# Patient Record
Sex: FEMALE | URBAN - METROPOLITAN AREA
[De-identification: names, ages, dates, MRNs, and addresses within clinical notes are randomized per-mention and may not be internally consistent; named-entity substitution may affect disease eponyms.]

---

## 2021-09-09 ENCOUNTER — OFFICE VISIT (OUTPATIENT)
Dept: URBAN - METROPOLITAN AREA CLINIC 10 | Facility: CLINIC | Age: 81
End: 2021-09-09
Payer: MEDICARE

## 2021-09-09 DIAGNOSIS — H25.13 AGE-RELATED NUCLEAR CATARACT, BILATERAL: Primary | ICD-10-CM

## 2021-09-09 PROCEDURE — 92133 CPTRZD OPH DX IMG PST SGM ON: CPT | Performed by: OPHTHALMOLOGY

## 2021-09-09 PROCEDURE — 92134 CPTRZ OPH DX IMG PST SGM RTA: CPT | Performed by: OPHTHALMOLOGY

## 2021-09-09 PROCEDURE — 92250 FUNDUS PHOTOGRAPHY W/I&R: CPT | Performed by: OPHTHALMOLOGY

## 2021-09-09 PROCEDURE — 92004 COMPRE OPH EXAM NEW PT 1/>: CPT | Performed by: OPHTHALMOLOGY

## 2021-09-09 ASSESSMENT — INTRAOCULAR PRESSURE
OD: 14
OS: 14

## 2021-09-09 ASSESSMENT — VISUAL ACUITY
OD: 20/25
OS: 20/30

## 2021-09-09 NOTE — IMPRESSION/PLAN
Impression: Age-related nuclear cataract, bilateral: H25.13. Plan: Discussed cataract with patient. Discussed treatment options. Surgical treatment is recommended. Surgical risk and benefits were discussed. The patient elects to proceed with surgery. Recommend surgery OU.

## 2021-09-09 NOTE — IMPRESSION/PLAN
Impression: Age-related nuclear cataract, bilateral: H25.13. Plan: Discussed cataract with patient. Discussed treatment options. Surgical treatment is recommended. Surgical risk and benefits were discussed. The patient elects to proceed with surgery. Recommend surgery OU.  OS first

## 2021-11-10 ENCOUNTER — ADULT PHYSICAL (OUTPATIENT)
Dept: URBAN - METROPOLITAN AREA CLINIC 10 | Facility: CLINIC | Age: 81
End: 2021-11-10
Payer: MEDICARE

## 2021-11-10 DIAGNOSIS — Z01.818 ENCOUNTER FOR OTHER PREPROCEDURAL EXAMINATION: Primary | ICD-10-CM

## 2021-11-10 PROCEDURE — 99202 OFFICE O/P NEW SF 15 MIN: CPT | Performed by: PHYSICIAN ASSISTANT

## 2021-11-10 PROCEDURE — 92025 CPTRIZED CORNEAL TOPOGRAPHY: CPT | Performed by: OPHTHALMOLOGY

## 2021-11-11 ENCOUNTER — PRE-OPERATIVE VISIT (OUTPATIENT)
Dept: URBAN - METROPOLITAN AREA CLINIC 10 | Facility: CLINIC | Age: 81
End: 2021-11-11
Payer: MEDICARE

## 2021-11-11 DIAGNOSIS — H52.223 REGULAR ASTIGMATISM, BILATERAL: ICD-10-CM

## 2021-11-11 DIAGNOSIS — H25.11 AGE-RELATED NUCLEAR CATARACT, RIGHT EYE: ICD-10-CM

## 2021-11-11 PROCEDURE — 99214 OFFICE O/P EST MOD 30 MIN: CPT | Performed by: OPHTHALMOLOGY

## 2021-11-11 NOTE — IMPRESSION/PLAN
Impression: Age-related nuclear cataract, bilateral: H25.13. Plan: Discussed cataract diagnosis with the patient. Discussed and reviewed treatment options for cataracts. Surgical treatment is required for cataracts. Risks and benefits of surgical treatment were discussed and understood. Patient elects surgical treatment. Recommend surgery OU,OS  first. PLAN STD LENS, DISTANCE AIM, NO UPGRADES, REVIEWED SORAYA, PLAN LRI. PATIENT UNDERSTANDS THE NEED FOR GLASSES POST-OP FOR BEST OVER ALL VISION. Discussed limitations to vision post op . Carlee Stanley If first eye doing well, ok to proceed with second eye surgery . Carlee Stanley  INJECTABLE

## 2021-11-18 ENCOUNTER — SURGERY (OUTPATIENT)
Dept: URBAN - METROPOLITAN AREA SURGERY 5 | Facility: SURGERY | Age: 81
End: 2021-11-18
Payer: MEDICARE

## 2021-11-18 PROCEDURE — 66984 XCAPSL CTRC RMVL W/O ECP: CPT | Performed by: OPHTHALMOLOGY

## 2021-11-19 ENCOUNTER — POST-OPERATIVE VISIT (OUTPATIENT)
Dept: URBAN - METROPOLITAN AREA CLINIC 10 | Facility: CLINIC | Age: 81
End: 2021-11-19

## 2021-11-19 PROCEDURE — 99024 POSTOP FOLLOW-UP VISIT: CPT | Performed by: OPTOMETRIST

## 2021-11-19 ASSESSMENT — INTRAOCULAR PRESSURE: OS: 12

## 2021-11-19 NOTE — IMPRESSION/PLAN
Impression: S/P Cataract Extraction/IOL; LRI (Limbal Relaxing Incision) OS - 1 Day. Presence of intraocular lens  Z96.1. Plan: RTC 1 week PO. --Advised patient to use artificial tears for comfort.

## 2021-11-24 ENCOUNTER — POST-OPERATIVE VISIT (OUTPATIENT)
Dept: URBAN - METROPOLITAN AREA CLINIC 10 | Facility: CLINIC | Age: 81
End: 2021-11-24

## 2021-11-24 DIAGNOSIS — Z48.810 ENCOUNTER FOR SURGICAL AFTERCARE FOLLOWING SURGERY ON A SENSE ORGAN: Primary | ICD-10-CM

## 2021-11-24 PROCEDURE — 99024 POSTOP FOLLOW-UP VISIT: CPT | Performed by: OPTOMETRIST

## 2021-11-24 ASSESSMENT — INTRAOCULAR PRESSURE
OD: 12
OS: 10

## 2021-11-24 ASSESSMENT — VISUAL ACUITY
OS: 20/30
OD: 20/25

## 2021-11-24 NOTE — IMPRESSION/PLAN
Impression: S/P Cataract Extraction/IOL; LRI (Limbal Relaxing Incision) OS - 6 Days. Encounter for surgical aftercare following surgery on a sense organ  Z48.810. Condition is improving - Plan: Pt happy c vision. Confirmed Distance aim OD Pt. noticing dexycu sub conj. Reassurance. 
 Cleared for surgery second eye OD

## 2021-12-02 ENCOUNTER — SURGERY (OUTPATIENT)
Dept: URBAN - METROPOLITAN AREA SURGERY 5 | Facility: SURGERY | Age: 81
End: 2021-12-02
Payer: MEDICARE

## 2021-12-02 PROCEDURE — 66984 XCAPSL CTRC RMVL W/O ECP: CPT | Performed by: OPHTHALMOLOGY

## 2021-12-03 ENCOUNTER — POST-OPERATIVE VISIT (OUTPATIENT)
Dept: URBAN - METROPOLITAN AREA CLINIC 10 | Facility: CLINIC | Age: 81
End: 2021-12-03
Payer: MEDICARE

## 2021-12-03 PROCEDURE — 99024 POSTOP FOLLOW-UP VISIT: CPT | Performed by: OPTOMETRIST

## 2021-12-03 ASSESSMENT — INTRAOCULAR PRESSURE: OD: 13

## 2021-12-03 NOTE — IMPRESSION/PLAN
Impression: S/P Cataract Extraction/IOL; LRI (Limbal Relaxing Incision) OD - 1 Day. Presence of intraocular lens  Z96.1. Plan: RTC as scheduled for 3 wk PO.

## 2021-12-21 ENCOUNTER — POST-OPERATIVE VISIT (OUTPATIENT)
Dept: URBAN - METROPOLITAN AREA CLINIC 10 | Facility: CLINIC | Age: 81
End: 2021-12-21
Payer: MEDICARE

## 2021-12-21 PROCEDURE — 99024 POSTOP FOLLOW-UP VISIT: CPT | Performed by: OPTOMETRIST

## 2021-12-21 RX ORDER — PREDNISOLONE ACETATE 10 MG/ML
1 % SUSPENSION/ DROPS OPHTHALMIC
Qty: 10 | Refills: 1 | Status: ACTIVE
Start: 2021-12-21

## 2021-12-21 ASSESSMENT — INTRAOCULAR PRESSURE
OS: 13
OD: 14

## 2021-12-21 ASSESSMENT — VISUAL ACUITY: OD: 20/40

## 2021-12-21 NOTE — IMPRESSION/PLAN
Impression: S/P Cataract Extraction/IOL; LRI (Limbal Relaxing Incision) OD - 19 Days. Presence of intraocular lens  Z96.1. Plan: Start Pred QID OU until seen. RTC 1-2 week PO with refraction check and IOP check.

## 2022-01-11 ENCOUNTER — OFFICE VISIT (OUTPATIENT)
Dept: URBAN - METROPOLITAN AREA CLINIC 10 | Facility: CLINIC | Age: 82
End: 2022-01-11
Payer: MEDICARE

## 2022-01-11 DIAGNOSIS — H00.014 HORDEOLUM, LEFT UPPER LID: ICD-10-CM

## 2022-01-11 PROCEDURE — 99024 POSTOP FOLLOW-UP VISIT: CPT | Performed by: OPTOMETRIST

## 2022-01-11 RX ORDER — CEPHALEXIN 500 MG/1
500 MG CAPSULE ORAL
Qty: 21 | Refills: 0 | Status: ACTIVE
Start: 2022-01-11

## 2022-01-11 ASSESSMENT — INTRAOCULAR PRESSURE
OD: 14
OS: 10

## 2022-01-11 ASSESSMENT — VISUAL ACUITY
OS: 20/60
OD: 20/50

## 2022-01-11 NOTE — IMPRESSION/PLAN
Impression: Hordeolum, left upper lid: H00.014. Plan: Start warm compress 5-10 minutes BID Start cephalexin 500mg PO TID  x 1 wk then d/c

## 2022-01-31 ENCOUNTER — POST-OPERATIVE VISIT (OUTPATIENT)
Dept: URBAN - METROPOLITAN AREA CLINIC 10 | Facility: CLINIC | Age: 82
End: 2022-01-31
Payer: MEDICARE

## 2022-01-31 PROCEDURE — 99024 POSTOP FOLLOW-UP VISIT: CPT | Performed by: OPTOMETRIST

## 2022-01-31 ASSESSMENT — VISUAL ACUITY
OD: 20/30
OS: 20/30

## 2022-01-31 NOTE — IMPRESSION/PLAN
Impression: S/P Cataract Extraction/IOL; LRI (Limbal Relaxing Incision) OD - 60 Days. Presence of intraocular lens  Z96.1. Plan: Taper Pred TID x 1 wk, BID x 1 wk, QD x 1 wk then d/c. Recommend warm compress BID OU with lid massages. RTC 1 month c IOP check and Mrx.

## 2022-02-28 ENCOUNTER — POST-OPERATIVE VISIT (OUTPATIENT)
Dept: URBAN - METROPOLITAN AREA CLINIC 10 | Facility: CLINIC | Age: 82
End: 2022-02-28
Payer: MEDICARE

## 2022-02-28 DIAGNOSIS — Z96.1 PRESENCE OF INTRAOCULAR LENS: Primary | ICD-10-CM

## 2022-02-28 PROCEDURE — 99024 POSTOP FOLLOW-UP VISIT: CPT | Performed by: OPTOMETRIST

## 2022-02-28 ASSESSMENT — INTRAOCULAR PRESSURE
OS: 14
OD: 14

## 2022-02-28 ASSESSMENT — VISUAL ACUITY
OS: 20/70
OD: 20/40

## 2022-02-28 NOTE — IMPRESSION/PLAN
Impression: S/P Cataract Extraction/IOL; LRI (Limbal Relaxing Incision) OD - 88 Days. Presence of intraocular lens  Z96.1. Excellent post op course   Post operative instructions reviewed - Plan: Iritis resolved. Very difficult manifest refraction. Attempted by doctor as well. Pt continues to complain about PVD OD. OS vision does not match ocular appearance. See retina team for evaluation.